# Patient Record
(demographics unavailable — no encounter records)

---

## 2025-03-25 NOTE — HISTORY OF PRESENT ILLNESS
[Mother] : mother [Fruit] : fruit [Vegetables] : vegetables [Grains] : grains [Eggs] : eggs [___ stools per day] : [unfilled]  stools per day [Firm] : stools are firm consistency [Toilet Trained] : toilet trained [Normal] : Normal [Brushing teeth] : Brushing teeth [Yes] : Patient goes to dentist yearly [Toothpaste] : Primary Fluoride Source: Toothpaste [Playtime (60 min/d)] : Playtime 60 min a day [< 2 hrs of screen time] : Less than 2 hrs of screen time [Appropiate parent-child communication] : Appropriate parent-child communication [Child given choices] : Child given choices [Child Cooperates] : Child cooperates [Parent has appropriate responses to behavior] : Parent has appropriate responses to behavior [No] : Not at  exposure [Water heater temperature set at <120 degrees F] : Water heater temperature set at <120 degrees F [Car seat in back seat] : Car seat in back seat [Carbon Monoxide Detectors] : Carbon monoxide detectors [Smoke Detectors] : Smoke detectors [Supervised outdoor play] : Supervised outdoor play [Up to date] : Up to date [NO] : No [Exposure to electronic nicotine delivery system] : No exposure to electronic nicotine delivery system [FreeTextEntry7] : ear infection starting 2 days ago, amoxicllin by urgent care, resolving [de-identified] : attention span [de-identified] : almond milk, water, vegetarian (occasionally eats chicken) [FreeTextEntry3] : 8-10hours [FreeTextEntry9] : in day care [FreeTextEntry1] : 5 yo F presents to clinic with mom for well child visit with no acute concerns.  Pt is currently being treated with amoxicillin (5 days, urgent care) for an ear infection, resolving. Mother states patient was only having pain, without fever.  Mother also voices concern about patient's attention span as she tends to lose focus easily when doing activities with mom. Day care has stated that she has trouble following directions as well. Mother would like to know if this is age appropriate behavior.  Mom states that patient occasionally wets the bed, but has not had an accident in school for the past 6 months.

## 2025-03-25 NOTE — DISCUSSION/SUMMARY
[Normal Growth] : growth [Normal Development] : development  [No Elimination Concerns] : elimination [Continue Regimen] : feeding [Normal Sleep Pattern] : sleep [None] : no medical problems [School Readiness] : school readiness [Healthy Personal Habits] : healthy personal habits [TV/Media] : tv/media [Child and Family Involvement] : child and family involvement [Safety] : safety [Anticipatory Guidance Given] : Anticipatory guidance addressed as per the history of present illness section [FreeTextEntry1] : 3 yo F presenting with mother for a well child visit. No acute concerns, except for resolving ear infection.  No growth, development, elimination, feeding, skin and sleep concerns. Attention span and behavior is age appropriate at this time. No known medical problems.     The following 4 year anticipatory guidance topics were discussed and/or handouts given: encouraging literacy activities, playing with peers, promoting physical activity and safety. Yogurt, full fat milk d/w to supplement almond milk. Pt is more selective with her food. Counseling for nutrition and physical activity was provided.  She is not on any medications.  Information discussed with parent/guardian.   4 year old here for WCC Doing well Attention span, age appropriate. Continue to monitor.  Occasional enuresis Otitis media, resolving with amoxicillin.   Continue balanced diet with all food groups. Brush teeth twice a day with toothbrush. Recommend visit to dentist. As per car seat 's guidelines, use forward-facing car seat in back seat of car. Switch to booster seat when child reaches highest weight/height for seat. Child needs to ride in a belt-positioning booster seat until 4 feet 9 inches has been reached and are between 8 and 12 years of age. Put toddler to sleep in own bed. Help toddler to maintain consistent daily routines and sleep schedule. Pre-K discussed. Ensure home is safe. Use consistent, positive discipline. Read aloud to toddler. Limit screen time to no more than 2 hours per day. Vaccines: DTAP-IPV, MMR, Influenza today Labs - CBC, Lead RTC after 5th birthday for [facility]

## 2025-03-25 NOTE — HISTORY OF PRESENT ILLNESS
[Mother] : mother [Fruit] : fruit [Vegetables] : vegetables [Grains] : grains [Eggs] : eggs [___ stools per day] : [unfilled]  stools per day [Firm] : stools are firm consistency [Toilet Trained] : toilet trained [Normal] : Normal [Brushing teeth] : Brushing teeth [Yes] : Patient goes to dentist yearly [Toothpaste] : Primary Fluoride Source: Toothpaste [Playtime (60 min/d)] : Playtime 60 min a day [< 2 hrs of screen time] : Less than 2 hrs of screen time [Appropiate parent-child communication] : Appropriate parent-child communication [Child given choices] : Child given choices [Child Cooperates] : Child cooperates [Parent has appropriate responses to behavior] : Parent has appropriate responses to behavior [No] : Not at  exposure [Water heater temperature set at <120 degrees F] : Water heater temperature set at <120 degrees F [Car seat in back seat] : Car seat in back seat [Carbon Monoxide Detectors] : Carbon monoxide detectors [Smoke Detectors] : Smoke detectors [Supervised outdoor play] : Supervised outdoor play [Up to date] : Up to date [NO] : No [Exposure to electronic nicotine delivery system] : No exposure to electronic nicotine delivery system [FreeTextEntry7] : ear infection starting 2 days ago, amoxicllin by urgent care, resolving [de-identified] : attention span [de-identified] : almond milk, water, vegetarian (occasionally eats chicken) [FreeTextEntry3] : 8-10hours [FreeTextEntry9] : in day care [FreeTextEntry1] : 3 yo F presents to clinic with mom for well child visit with no acute concerns.  Pt is currently being treated with amoxicillin (5 days, urgent care) for an ear infection, resolving. Mother states patient was only having pain, without fever.  Mother also voices concern about patient's attention span as she tends to lose focus easily when doing activities with mom. Day care has stated that she has trouble following directions as well. Mother would like to know if this is age appropriate behavior.  Mom states that patient occasionally wets the bed, but has not had an accident in school for the past 6 months.

## 2025-03-25 NOTE — PHYSICAL EXAM
[Alert] : alert [No Acute Distress] : no acute distress [Crying] : crying [Playful] : playful [Normocephalic] : normocephalic [Conjunctivae with no discharge] : conjunctivae with no discharge [PERRL] : PERRL [EOMI Bilateral] : EOMI bilateral [Auricles Well Formed] : auricles well formed [Clear Tympanic membranes with present light reflex and bony landmarks] : clear tympanic membranes with present light reflex and bony landmarks [No Discharge] : no discharge [Nares Patent] : nares patent [Pink Nasal Mucosa] : pink nasal mucosa [Palate Intact] : palate intact [Uvula Midline] : uvula midline [Nonerythematous Oropharynx] : nonerythematous oropharynx [No Caries] : no caries [Trachea Midline] : trachea midline [Supple, full passive range of motion] : supple, full passive range of motion [No Palpable Masses] : no palpable masses [Symmetric Chest Rise] : symmetric chest rise [Clear to Auscultation Bilaterally] : clear to auscultation bilaterally [Normoactive Precordium] : normoactive precordium [Regular Rate and Rhythm] : regular rate and rhythm [Normal S1, S2 present] : normal S1, S2 present [No Murmurs] : no murmurs [+2 Femoral Pulses] : +2 femoral pulses [Soft] : soft [NonTender] : non tender [Non Distended] : non distended [Normoactive Bowel Sounds] : normoactive bowel sounds [No Hepatomegaly] : no hepatomegaly [No Splenomegaly] : no splenomegaly [Magdy 1] : Magdy 1 [No Clitoromegaly] : no clitoromegaly [Normal Vagina Introitus] : normal vagina introitus [No Abnormal Lymph Nodes Palpated] : no abnormal lymph nodes palpated [No Gait Asymmetry] : no gait asymmetry [No pain or deformities with palpation of bone, muscles, joints] : no pain or deformities with palpation of bone, muscles, joints [Normal Muscle Tone] : normal muscle tone [No Spinal Dimple] : no spinal dimple [NoTuft of Hair] : no tuft of hair [Straight] : straight [Cranial Nerves Grossly Intact] : cranial nerves grossly intact [No Rash or Lesions] : no rash or lesions [Patent] : patent [Normally Placed] : normally placed

## 2025-03-25 NOTE — DISCUSSION/SUMMARY
[Normal Growth] : growth [Normal Development] : development  [No Elimination Concerns] : elimination [Continue Regimen] : feeding [Normal Sleep Pattern] : sleep [None] : no medical problems [School Readiness] : school readiness [Healthy Personal Habits] : healthy personal habits [TV/Media] : tv/media [Child and Family Involvement] : child and family involvement [Safety] : safety [Anticipatory Guidance Given] : Anticipatory guidance addressed as per the history of present illness section [FreeTextEntry1] : 3 yo F presenting with mother for a well child visit. No acute concerns, except for resolving ear infection.  No growth, development, elimination, feeding, skin and sleep concerns. Attention span and behavior is age appropriate at this time. No known medical problems.     The following 4 year anticipatory guidance topics were discussed and/or handouts given: encouraging literacy activities, playing with peers, promoting physical activity and safety. Yogurt, full fat milk d/w to supplement almond milk. Pt is more selective with her food. Counseling for nutrition and physical activity was provided.  She is not on any medications.  Information discussed with parent/guardian.   4 year old here for WCC Doing well Attention span, age appropriate. Continue to monitor.  Occasional enuresis Otitis media, resolving with amoxicillin.   Continue balanced diet with all food groups. Brush teeth twice a day with toothbrush. Recommend visit to dentist. As per car seat 's guidelines, use forward-facing car seat in back seat of car. Switch to booster seat when child reaches highest weight/height for seat. Child needs to ride in a belt-positioning booster seat until 4 feet 9 inches has been reached and are between 8 and 12 years of age. Put toddler to sleep in own bed. Help toddler to maintain consistent daily routines and sleep schedule. Pre-K discussed. Ensure home is safe. Use consistent, positive discipline. Read aloud to toddler. Limit screen time to no more than 2 hours per day. Vaccines: DTAP-IPV, MMR, Influenza today Labs - CBC, Lead RTC after 5th birthday for [facility]

## 2025-03-25 NOTE — DEVELOPMENTAL MILESTONES
[Normal Development] : Normal Development [Yes: _______] : yes, [unfilled] [Goes to the bathroom and has] : goes to bathroom and has bowel movement by self [Plays make-believe] : plays make-believe [Uses 4-word sentences] : uses 4-word sentences [Uses words that are 100%] : uses words that are 100% intelligible to strangers [Tells a story from a book] : tells a story from a book [Climbs stairs, alternating feet] : climbs stairs, alternating feet without support [Skips on one foot] : skips on one foot [Draws a person with head and] : draws a person with head and 3 body part [Draws a simple cross] : draws a simple cross [Unbuttons medium-sized buttons] : unbuttons medium sized buttons [Grasps a pencil with thumb and] : grasps a pencil with thumb and fingers instead of fist [Draws recognizable pictures] : draws recognizable pictures [Dresses and undresses without] : does not dress and undress without much help